# Patient Record
Sex: MALE | Race: NATIVE HAWAIIAN OR OTHER PACIFIC ISLANDER
[De-identification: names, ages, dates, MRNs, and addresses within clinical notes are randomized per-mention and may not be internally consistent; named-entity substitution may affect disease eponyms.]

---

## 2017-07-15 ENCOUNTER — HOSPITAL ENCOUNTER (EMERGENCY)
Dept: HOSPITAL 67 - ED | Age: 52
Discharge: HOME | End: 2017-07-15
Payer: COMMERCIAL

## 2017-07-15 VITALS — WEIGHT: 197 LBS | BODY MASS INDEX: 30.92 KG/M2 | HEIGHT: 67 IN

## 2017-07-15 VITALS — TEMPERATURE: 98 F | DIASTOLIC BLOOD PRESSURE: 79 MMHG | SYSTOLIC BLOOD PRESSURE: 121 MMHG

## 2017-07-15 DIAGNOSIS — F19.10: Primary | ICD-10-CM

## 2017-07-15 LAB
PLATELET # BLD: 250 K/UL (ref 142–355)
POTASSIUM SERPL-SCNC: 3.8 MMOL/L (ref 3.6–5.2)
SODIUM SERPL-SCNC: 136 MMOL/L (ref 136–145)

## 2017-07-15 PROCEDURE — 80053 COMPREHEN METABOLIC PANEL: CPT

## 2017-07-15 PROCEDURE — 99283 EMERGENCY DEPT VISIT LOW MDM: CPT

## 2017-07-15 PROCEDURE — 85027 COMPLETE CBC AUTOMATED: CPT

## 2017-07-15 PROCEDURE — 81000 URINALYSIS NONAUTO W/SCOPE: CPT

## 2017-07-15 PROCEDURE — 80329 ANALGESICS NON-OPIOID 1 OR 2: CPT

## 2017-07-15 PROCEDURE — 80320 DRUG SCREEN QUANTALCOHOLS: CPT

## 2017-07-15 PROCEDURE — G0479 DRUG TEST PRESUMP NOT OPT: HCPCS

## 2017-07-15 PROCEDURE — 80307 DRUG TEST PRSMV CHEM ANLYZR: CPT

## 2021-03-16 ENCOUNTER — HOSPITAL ENCOUNTER (INPATIENT)
Dept: HOSPITAL 67 - ED | Age: 56
LOS: 1 days | Discharge: HOME | DRG: 918 | End: 2021-03-17
Attending: INTERNAL MEDICINE | Admitting: INTERNAL MEDICINE
Payer: COMMERCIAL

## 2021-03-16 VITALS — SYSTOLIC BLOOD PRESSURE: 122 MMHG | TEMPERATURE: 98.8 F | DIASTOLIC BLOOD PRESSURE: 82 MMHG

## 2021-03-16 VITALS — DIASTOLIC BLOOD PRESSURE: 82 MMHG | SYSTOLIC BLOOD PRESSURE: 140 MMHG

## 2021-03-16 VITALS
WEIGHT: 182.38 LBS | WEIGHT: 182.38 LBS | BODY MASS INDEX: 28.63 KG/M2 | DIASTOLIC BLOOD PRESSURE: 93 MMHG | HEIGHT: 67 IN | HEIGHT: 67 IN | SYSTOLIC BLOOD PRESSURE: 140 MMHG | BODY MASS INDEX: 28.63 KG/M2

## 2021-03-16 VITALS — DIASTOLIC BLOOD PRESSURE: 51 MMHG | SYSTOLIC BLOOD PRESSURE: 133 MMHG

## 2021-03-16 VITALS — TEMPERATURE: 98.5 F | DIASTOLIC BLOOD PRESSURE: 88 MMHG | SYSTOLIC BLOOD PRESSURE: 136 MMHG

## 2021-03-16 VITALS — DIASTOLIC BLOOD PRESSURE: 81 MMHG | SYSTOLIC BLOOD PRESSURE: 117 MMHG

## 2021-03-16 VITALS — DIASTOLIC BLOOD PRESSURE: 87 MMHG | SYSTOLIC BLOOD PRESSURE: 142 MMHG

## 2021-03-16 VITALS — SYSTOLIC BLOOD PRESSURE: 131 MMHG | DIASTOLIC BLOOD PRESSURE: 91 MMHG

## 2021-03-16 VITALS — SYSTOLIC BLOOD PRESSURE: 131 MMHG | DIASTOLIC BLOOD PRESSURE: 80 MMHG

## 2021-03-16 VITALS — DIASTOLIC BLOOD PRESSURE: 80 MMHG | SYSTOLIC BLOOD PRESSURE: 142 MMHG

## 2021-03-16 VITALS — SYSTOLIC BLOOD PRESSURE: 135 MMHG | DIASTOLIC BLOOD PRESSURE: 89 MMHG

## 2021-03-16 VITALS — DIASTOLIC BLOOD PRESSURE: 76 MMHG | SYSTOLIC BLOOD PRESSURE: 131 MMHG

## 2021-03-16 VITALS — DIASTOLIC BLOOD PRESSURE: 88 MMHG | SYSTOLIC BLOOD PRESSURE: 133 MMHG

## 2021-03-16 VITALS — SYSTOLIC BLOOD PRESSURE: 144 MMHG | DIASTOLIC BLOOD PRESSURE: 91 MMHG

## 2021-03-16 VITALS — DIASTOLIC BLOOD PRESSURE: 84 MMHG | SYSTOLIC BLOOD PRESSURE: 129 MMHG

## 2021-03-16 VITALS — SYSTOLIC BLOOD PRESSURE: 131 MMHG | DIASTOLIC BLOOD PRESSURE: 89 MMHG

## 2021-03-16 VITALS — DIASTOLIC BLOOD PRESSURE: 92 MMHG | SYSTOLIC BLOOD PRESSURE: 146 MMHG

## 2021-03-16 VITALS — TEMPERATURE: 99 F | SYSTOLIC BLOOD PRESSURE: 134 MMHG | DIASTOLIC BLOOD PRESSURE: 87 MMHG

## 2021-03-16 VITALS — SYSTOLIC BLOOD PRESSURE: 122 MMHG | DIASTOLIC BLOOD PRESSURE: 82 MMHG | TEMPERATURE: 98.8 F

## 2021-03-16 VITALS — SYSTOLIC BLOOD PRESSURE: 140 MMHG | DIASTOLIC BLOOD PRESSURE: 83 MMHG

## 2021-03-16 VITALS — DIASTOLIC BLOOD PRESSURE: 93 MMHG | SYSTOLIC BLOOD PRESSURE: 152 MMHG

## 2021-03-16 VITALS — SYSTOLIC BLOOD PRESSURE: 150 MMHG | DIASTOLIC BLOOD PRESSURE: 84 MMHG

## 2021-03-16 VITALS — DIASTOLIC BLOOD PRESSURE: 81 MMHG | SYSTOLIC BLOOD PRESSURE: 137 MMHG

## 2021-03-16 DIAGNOSIS — G89.4: ICD-10-CM

## 2021-03-16 DIAGNOSIS — T40.1X1A: Primary | ICD-10-CM

## 2021-03-16 DIAGNOSIS — R79.89: ICD-10-CM

## 2021-03-16 DIAGNOSIS — I10: ICD-10-CM

## 2021-03-16 DIAGNOSIS — Z72.0: ICD-10-CM
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
## 2021-03-16 DIAGNOSIS — F19.10: ICD-10-CM

## 2021-03-16 DIAGNOSIS — E87.6: ICD-10-CM

## 2021-03-16 LAB
PLATELET # BLD: 214 K/UL (ref 142–355)
POTASSIUM SERPL-SCNC: 3.1 MMOL/L (ref 3.6–5.2)
SODIUM SERPL-SCNC: 139 MMOL/L (ref 136–145)

## 2021-03-16 PROCEDURE — 81000 URINALYSIS NONAUTO W/SCOPE: CPT

## 2021-03-16 PROCEDURE — U0003 INFECTIOUS AGENT DETECTION BY NUCLEIC ACID (DNA OR RNA); SEVERE ACUTE RESPIRATORY SYNDROME CORONAVIRUS 2 (SARS-COV-2) (CORONAVIRUS DISEASE [COVID-19]), AMPLIFIED PROBE TECHNIQUE, MAKING USE OF HIGH THROUGHPUT TECHNOLOGIES AS DESCRIBED BY CMS-2020-01-R: HCPCS

## 2021-03-16 PROCEDURE — 80320 DRUG SCREEN QUANTALCOHOLS: CPT

## 2021-03-16 PROCEDURE — 87635 SARS-COV-2 COVID-19 AMP PRB: CPT

## 2021-03-16 PROCEDURE — 96360 HYDRATION IV INFUSION INIT: CPT

## 2021-03-16 PROCEDURE — 93005 ELECTROCARDIOGRAM TRACING: CPT

## 2021-03-16 PROCEDURE — 80053 COMPREHEN METABOLIC PANEL: CPT

## 2021-03-16 PROCEDURE — 99285 EMERGENCY DEPT VISIT HI MDM: CPT

## 2021-03-16 PROCEDURE — 80307 DRUG TEST PRSMV CHEM ANLYZR: CPT

## 2021-03-16 PROCEDURE — 80074 ACUTE HEPATITIS PANEL: CPT

## 2021-03-16 PROCEDURE — 84132 ASSAY OF SERUM POTASSIUM: CPT

## 2021-03-16 PROCEDURE — 51702 INSERT TEMP BLADDER CATH: CPT

## 2021-03-16 PROCEDURE — 80329 ANALGESICS NON-OPIOID 1 OR 2: CPT

## 2021-03-16 PROCEDURE — 85027 COMPLETE CBC AUTOMATED: CPT

## 2021-03-16 PROCEDURE — 36415 COLL VENOUS BLD VENIPUNCTURE: CPT

## 2021-03-16 NOTE — NUR
PT AWAKE, ALERT, AND ORIENTED X4 IN POSITION OF COMFORT TALKING WITH WRITER
AND ASKING ABOUT HIS CARE/LAB WORK TALKING ABOUT WHAT HAPPENED THAT LEAD HIM
TO BE BROUGHT TO ER.  REASSURED PT AND TALKED WITH HIM.  BROUGHT PT SNACK TO
EAT DUE TO LOC ALERT AND ORIENTED(ORDER TO ADVANCE DIET FROM NPO ONCE LOC
IMPROVED).  PT NOW EATING AND CONTINUES TO TALK WITH STAFF, NO DISTRESS,
DENIES ANY PROBLEMS, RAILS UP, BED IN LOW POSITION.

## 2021-03-16 NOTE — NUR
PT HAD A LARGE FORMED BROWN BM. PERICARE PER SELF. SL UNSTEADY WHEN GETTING
BACK IN BED. REQUESTED TO USE THE PHONE 'TO SEE WHERE MY CAR IS'. PT USED TELE
TO CK ON CAR.

## 2021-03-16 NOTE — NUR
REMAINS AWAKE, ALERT, AND ORIENTED SITTING UP IN BED TALKING WITH STAFF ABOUT
HIS CARE AND LAB RESULTS, DENIES ANY PROBLEMS OR NEEDS.  ATE SNACK(CHIPS) AND
DRANK A BOTTLE OF WATER WITH NO PROBLEMS OR S/S OF ASPIRATION.  WILL MONITOR
CLOSELY, RAILS UP, BED IN LOW POSITION, ENCOURAGED TO CALL AS NEEDED.

## 2021-03-16 NOTE — NUR
PT TO ICU VIA STRETCHER ADMIT FROM ER.PT MOVED SELF TO BED FROM STRETCHER.PT
VERY DROWSY, SPEECH MUMBLING BUT KNOWS HIS NAME. IO INTACT R LOWER LEG, SALINE
LOCKED. PT'S JEANS & SHOES& SOCKS  REMOVED WITH HIS ASSIST TO LIFT HIPS.ZHAO
TO BSD WITH CLEAR YELLOW URINE.COLOR PINK, SKIN W/D. NOTED WHAT APPEARS TO BE
NEEDLE AMADOR INSIDE L FA.

## 2021-03-16 NOTE — NUR
PT AWAKE & TRYING TO GET OUT OF BED. PT C/O NEED TO HAVE A BM. PT ASSISTED TO
BSC. FOR BM. STEADY WHEN STANDING BRIEFLY.

## 2021-03-16 NOTE — NUR
RESTING IN POSITION OF COMFORT WITH EYES CLOSED, NO S/S OF PAIN OR DISTRESS
NOTED, RESP RATE NORMAL AND NONLABORED, ZHAO PATENT DRAINING TO BEDSIDE, IV
LOCK INTACT TO R WRIST, IO INTACT TO R LOWER LEG, IV INTACT TO L WRIST WITH NS
INFUSING AT 125ML/HR, VITALS BEING MONITORED/STABLE PULSE RATE 70-80s, CARDIAC
MONITOR IN USE WITH REGULAR RHYTHM NOTED, WILL MONITOR CLOSELY, RAILS UP, BED
IN LOW POSITION, CALL LIGHT IN REACH.

## 2021-03-16 NOTE — NUR
RESTING WITH EYES CLOSED, NO S/S OF PAIN OR DISTRESS NOTED, WILL MONITOR
CLOSELY, RAILS UP X3, BED IN LOW POSITION, CALL LIGHT IN REACH, HOB ELEVATED.
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
## 2021-03-17 VITALS — DIASTOLIC BLOOD PRESSURE: 68 MMHG | SYSTOLIC BLOOD PRESSURE: 112 MMHG

## 2021-03-17 VITALS — DIASTOLIC BLOOD PRESSURE: 65 MMHG | SYSTOLIC BLOOD PRESSURE: 134 MMHG

## 2021-03-17 VITALS — DIASTOLIC BLOOD PRESSURE: 75 MMHG | SYSTOLIC BLOOD PRESSURE: 137 MMHG

## 2021-03-17 VITALS — DIASTOLIC BLOOD PRESSURE: 81 MMHG | SYSTOLIC BLOOD PRESSURE: 126 MMHG

## 2021-03-17 VITALS — DIASTOLIC BLOOD PRESSURE: 63 MMHG | SYSTOLIC BLOOD PRESSURE: 114 MMHG

## 2021-03-17 VITALS — DIASTOLIC BLOOD PRESSURE: 72 MMHG | SYSTOLIC BLOOD PRESSURE: 136 MMHG

## 2021-03-17 VITALS — DIASTOLIC BLOOD PRESSURE: 58 MMHG | TEMPERATURE: 99.5 F | SYSTOLIC BLOOD PRESSURE: 117 MMHG

## 2021-03-17 VITALS — SYSTOLIC BLOOD PRESSURE: 144 MMHG | DIASTOLIC BLOOD PRESSURE: 88 MMHG

## 2021-03-17 VITALS — SYSTOLIC BLOOD PRESSURE: 116 MMHG | DIASTOLIC BLOOD PRESSURE: 61 MMHG

## 2021-03-17 VITALS — TEMPERATURE: 98.2 F | DIASTOLIC BLOOD PRESSURE: 78 MMHG | SYSTOLIC BLOOD PRESSURE: 119 MMHG

## 2021-03-17 VITALS — SYSTOLIC BLOOD PRESSURE: 115 MMHG | DIASTOLIC BLOOD PRESSURE: 70 MMHG

## 2021-03-17 VITALS — SYSTOLIC BLOOD PRESSURE: 102 MMHG | DIASTOLIC BLOOD PRESSURE: 66 MMHG

## 2021-03-17 LAB
PLATELET # BLD: 229 K/UL (ref 142–355)
POTASSIUM SERPL-SCNC: 3.7 MMOL/L (ref 3.6–5.2)
SODIUM SERPL-SCNC: 138 MMOL/L (ref 136–145)

## 2021-03-17 NOTE — NUR
PATIENT RESTING IN BED SLEEPING. NAD NOTED. 20 G PERIPHERAL IV'S INTACT IN
RIGHT AND LEFT WRISTS. IO TO RIGHT LOWER LEG IN PLACE. NS INFUSING AT
125ML/HR. BED IN LOWEST POSITION. CALL LIGHT WITHIN REACH. UPPER SIDE RAILS UP
X2. VITAL SIGNS STABLE AND BEING MONITORED. WILL CONTINUE TO MONITOR.

## 2021-03-17 NOTE — NUR
IO TO THE RIGHT LOWER EXTREMITY REMOVED WITH EMPTY SYRINGE LUER LOCK IN PLACE
AND TWISTING MOTION. IO REMOVED WITH TIP INTACT AND COVERED WITH 4X4 GAUZE AND
TAPE. NO BLEEDING NOTED WHEN REMOVED. CIRCULATION TO LOWER EXTREMITY IS GOOD
WITH PEDAL PULSE PRESENT, CAPILLARY REFILL< 3 SECONDS AND AREA IS WARM TO
TOUCH. PATIENT TOLERATED WELL.

## 2021-03-17 NOTE — NUR
EMPTIED ZHAO BAG APPROX. 1400 ML OF TOÑO URINE WAS NOTED AND DISCARDED. 10
ML OF NS REMOVED FROM BULB AND ZHAO CATHETER DISCONTINUED WITH TIP INTACT.
PATIENT TOLERTED PROCEDURE WELL.

## 2021-03-17 NOTE — NUR
RESTING IN BED WITH EYES CLOSED AND SNORING NOTED, NO S/S OF PAIN OR DISTRESS
NOTED, RESP RATE NONLABORED, ON ROOM AIR WITH SAT OF 96%, ZHAO PATENT
DRAINING TO BEDSIDE, BOTH IV SITES INTACT, IO INTACT TO R LOWER LEG, NS
INFUSING AT 125ML/HR, VITALS BEING MONITORED/STABLE, CARDIAC MONITOR IN USE
WITH REGULAR RATE IN 70s.  WILL MONITOR CLOSELY, RAILS UP, BED IN LOW
POSITION.

## 2021-03-17 NOTE — NUR
PATIENT BELONGINGS AT THE BEDSIDE IN [facility] BACK PER NIGHT SHIFT NURSE.
PATIENT GOT ALL HIS BELONGINGS PRIOR TO LEAVING. PATIENT STATED " EVERYTHING
IS IN HERE $ 22.41, MY [employer]".

## 2021-03-17 NOTE — NUR
PATIENT TRANSFERRED FROM ICU 3 TO MED SURG FLOOR 3 VIA WHEELCHAIR. PATIENT IS
AOX4. PATIENT LEFT IN NO ACUTE DISTRESS.

## 2021-03-17 NOTE — NUR
PATIENT IS AOX4. PATIENT ATE WITHOUT ASSISTANCE ALL HIS LUNCH 100%. PATIENT
IS ABLE TO STAND UP AND USE THE BEDSIDE COMMODE W/O ASSISTANCE AND HE DID HAVE
A LARGE BROWN SOFT BM. PATIENT DID ASK TO GO OUTSIDE AND SMOKE AND THIS NURSE
NOTIFIED HIM WE DID NOT ALLOW PATIENTS ADMITTED TO THE INTENSIVE CARE UNIT TO
GO OUTSIDE AND SMOKE. PATIENT VOICED UNDERSTANDING.
 
 TALKING TO PATIENT AT THIS TIME.

## 2021-03-17 NOTE — NUR
RESTING WITH EYES CLOSED AND SNORING NOTED OFF AND ON, NO S/S OF PAIN OR
DISTRESS NOTED, RESP RATE 18 NONLABORED, ON ROOM AIR WITH SAT OF 98%, CARDIAC
MONITOR IN USE, BOTH IV SITES INTACT AND IO SITE INTACT, VITALS BEING
MONITORED, WILL MONITOR CLOSELY, RAILS UP, BED IN LOW POSITION.

## 2021-03-17 NOTE — NUR
RESTING WITH EYES CLOSED AND SNORING NOTED AT TIMES, RESP RATE 18 NONLABORED,
ON ROOM AIR, BOTH IV SITES INTACT AND IO SITE INTACT, ZHAO PATENT DRAINING TO
BEDSIDE, VITALS BEING MONITORED/STABLE, LAYING ON R SIDE IN BED WITH NO S/S OF
PAIN OR DISTRESS NOTED, WILL MONITOR, RAILS UP, BED IN LOW POSITION.
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
## 2021-03-17 NOTE — NUR
BOTH 20G IV'S TO THE WRIST WERE REMOVED WITH TIP INTACT AND COVERED WITH 2X2
AND SECURED WITH KOBAN.

## 2021-08-17 ENCOUNTER — HOSPITAL ENCOUNTER (EMERGENCY)
Dept: HOSPITAL 67 - ED | Age: 56
End: 2021-08-17
Payer: COMMERCIAL

## 2021-08-17 VITALS — WEIGHT: 182 LBS | HEIGHT: 67 IN | BODY MASS INDEX: 28.56 KG/M2

## 2021-08-17 DIAGNOSIS — I46.9: Primary | ICD-10-CM

## 2021-08-17 PROCEDURE — 99285 EMERGENCY DEPT VISIT HI MDM: CPT

## 2021-08-17 PROCEDURE — 96374 THER/PROPH/DIAG INJ IV PUSH: CPT

## 2021-08-17 PROCEDURE — 96375 TX/PRO/DX INJ NEW DRUG ADDON: CPT

## 2021-08-17 PROCEDURE — 5A12012 PERFORMANCE OF CARDIAC OUTPUT, SINGLE, MANUAL: ICD-10-PCS | Performed by: EMERGENCY MEDICINE

## 2021-08-17 PROCEDURE — 92950 HEART/LUNG RESUSCITATION CPR: CPT

## 2021-08-17 PROCEDURE — 96360 HYDRATION IV INFUSION INIT: CPT
